# Patient Record
Sex: FEMALE | Race: WHITE | NOT HISPANIC OR LATINO
[De-identification: names, ages, dates, MRNs, and addresses within clinical notes are randomized per-mention and may not be internally consistent; named-entity substitution may affect disease eponyms.]

---

## 2017-02-27 ENCOUNTER — APPOINTMENT (OUTPATIENT)
Dept: ENDOCRINOLOGY | Facility: CLINIC | Age: 62
End: 2017-02-27

## 2017-05-16 ENCOUNTER — APPOINTMENT (OUTPATIENT)
Dept: ENDOCRINOLOGY | Facility: CLINIC | Age: 62
End: 2017-05-16

## 2017-06-14 ENCOUNTER — EMERGENCY (EMERGENCY)
Facility: HOSPITAL | Age: 62
LOS: 1 days | Discharge: HOME | End: 2017-06-14
Attending: EMERGENCY MEDICINE | Admitting: EMERGENCY MEDICINE
Payer: COMMERCIAL

## 2017-06-14 VITALS
HEART RATE: 80 BPM | SYSTOLIC BLOOD PRESSURE: 149 MMHG | DIASTOLIC BLOOD PRESSURE: 72 MMHG | WEIGHT: 162.04 LBS | OXYGEN SATURATION: 98 % | RESPIRATION RATE: 16 BRPM | TEMPERATURE: 98 F

## 2017-06-14 VITALS
OXYGEN SATURATION: 99 % | DIASTOLIC BLOOD PRESSURE: 79 MMHG | HEART RATE: 72 BPM | TEMPERATURE: 98 F | RESPIRATION RATE: 16 BRPM | SYSTOLIC BLOOD PRESSURE: 122 MMHG

## 2017-06-14 DIAGNOSIS — E78.5 HYPERLIPIDEMIA, UNSPECIFIED: ICD-10-CM
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
## 2017-06-14 DIAGNOSIS — S00.83XA CONTUSION OF OTHER PART OF HEAD, INITIAL ENCOUNTER: ICD-10-CM
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
## 2017-06-14 DIAGNOSIS — S09.90XA UNSPECIFIED INJURY OF HEAD, INITIAL ENCOUNTER: ICD-10-CM

## 2017-06-14 LAB
ALBUMIN SERPL ELPH-MCNC: 4.2 G/DL — SIGNIFICANT CHANGE UP (ref 3.3–5)
ALP SERPL-CCNC: 97 U/L — SIGNIFICANT CHANGE UP (ref 40–120)
ALT FLD-CCNC: 14 U/L — SIGNIFICANT CHANGE UP (ref 10–45)
ANION GAP SERPL CALC-SCNC: 15 MMOL/L — SIGNIFICANT CHANGE UP (ref 5–17)
AST SERPL-CCNC: 17 U/L — SIGNIFICANT CHANGE UP (ref 10–40)
BASOPHILS NFR BLD AUTO: 0.3 % — SIGNIFICANT CHANGE UP (ref 0–2)
BILIRUB SERPL-MCNC: 0.6 MG/DL — SIGNIFICANT CHANGE UP (ref 0.2–1.2)
BUN SERPL-MCNC: 7 MG/DL — SIGNIFICANT CHANGE UP (ref 7–23)
CALCIUM SERPL-MCNC: 9.3 MG/DL — SIGNIFICANT CHANGE UP (ref 8.4–10.5)
CHLORIDE SERPL-SCNC: 96 MMOL/L — SIGNIFICANT CHANGE UP (ref 96–108)
CK MB CFR SERPL CALC: 2.6 NG/ML — SIGNIFICANT CHANGE UP (ref 0–6.7)
CK SERPL-CCNC: 80 U/L — SIGNIFICANT CHANGE UP (ref 25–170)
CO2 SERPL-SCNC: 22 MMOL/L — SIGNIFICANT CHANGE UP (ref 22–31)
CREAT SERPL-MCNC: 0.5 MG/DL — SIGNIFICANT CHANGE UP (ref 0.5–1.3)
EOSINOPHIL NFR BLD AUTO: 0.3 % — SIGNIFICANT CHANGE UP (ref 0–6)
GLUCOSE SERPL-MCNC: 107 MG/DL — HIGH (ref 70–99)
HCT VFR BLD CALC: 36.9 % — SIGNIFICANT CHANGE UP (ref 34.5–45)
HGB BLD-MCNC: 12.3 G/DL — SIGNIFICANT CHANGE UP (ref 11.5–15.5)
LYMPHOCYTES # BLD AUTO: 11.4 % — LOW (ref 13–44)
MCHC RBC-ENTMCNC: 29.4 PG — SIGNIFICANT CHANGE UP (ref 27–34)
MCHC RBC-ENTMCNC: 33.3 G/DL — SIGNIFICANT CHANGE UP (ref 32–36)
MCV RBC AUTO: 88.3 FL — SIGNIFICANT CHANGE UP (ref 80–100)
MONOCYTES NFR BLD AUTO: 4.8 % — SIGNIFICANT CHANGE UP (ref 2–14)
NEUTROPHILS NFR BLD AUTO: 83.2 % — HIGH (ref 43–77)
PLATELET # BLD AUTO: 261 K/UL — SIGNIFICANT CHANGE UP (ref 150–400)
POTASSIUM SERPL-MCNC: 4.2 MMOL/L — SIGNIFICANT CHANGE UP (ref 3.5–5.3)
POTASSIUM SERPL-SCNC: 4.2 MMOL/L — SIGNIFICANT CHANGE UP (ref 3.5–5.3)
PROT SERPL-MCNC: 7 G/DL — SIGNIFICANT CHANGE UP (ref 6–8.3)
RBC # BLD: 4.18 M/UL — SIGNIFICANT CHANGE UP (ref 3.8–5.2)
RBC # FLD: 13.1 % — SIGNIFICANT CHANGE UP (ref 10.3–16.9)
SODIUM SERPL-SCNC: 133 MMOL/L — LOW (ref 135–145)
TROPONIN T SERPL-MCNC: <0.01 NG/ML — SIGNIFICANT CHANGE UP (ref 0–0.01)
TSH SERPL-MCNC: 0.77 UIU/ML — SIGNIFICANT CHANGE UP (ref 0.35–4.94)
WBC # BLD: 6.6 K/UL — SIGNIFICANT CHANGE UP (ref 3.8–10.5)
WBC # FLD AUTO: 6.6 K/UL — SIGNIFICANT CHANGE UP (ref 3.8–10.5)

## 2017-06-14 PROCEDURE — 84484 ASSAY OF TROPONIN QUANT: CPT

## 2017-06-14 PROCEDURE — 70450 CT HEAD/BRAIN W/O DYE: CPT

## 2017-06-14 PROCEDURE — 70450 CT HEAD/BRAIN W/O DYE: CPT | Mod: 26

## 2017-06-14 PROCEDURE — 99285 EMERGENCY DEPT VISIT HI MDM: CPT | Mod: 25

## 2017-06-14 PROCEDURE — 93010 ELECTROCARDIOGRAM REPORT: CPT

## 2017-06-14 PROCEDURE — 80053 COMPREHEN METABOLIC PANEL: CPT

## 2017-06-14 PROCEDURE — 82553 CREATINE MB FRACTION: CPT

## 2017-06-14 PROCEDURE — 82550 ASSAY OF CK (CPK): CPT

## 2017-06-14 PROCEDURE — 93005 ELECTROCARDIOGRAM TRACING: CPT

## 2017-06-14 PROCEDURE — 85025 COMPLETE CBC W/AUTO DIFF WBC: CPT

## 2017-06-14 PROCEDURE — 36415 COLL VENOUS BLD VENIPUNCTURE: CPT

## 2017-06-14 PROCEDURE — 99284 EMERGENCY DEPT VISIT MOD MDM: CPT | Mod: 25

## 2017-06-14 PROCEDURE — 84443 ASSAY THYROID STIM HORMONE: CPT

## 2017-06-14 RX ORDER — LEVOTHYROXINE SODIUM 125 MCG
0 TABLET ORAL
Qty: 0 | Refills: 0 | COMMUNITY

## 2017-06-14 RX ORDER — PROPRANOLOL HCL 160 MG
0 CAPSULE, EXTENDED RELEASE 24HR ORAL
Qty: 0 | Refills: 0 | COMMUNITY

## 2017-06-14 RX ORDER — IBUPROFEN 200 MG
0 TABLET ORAL
Qty: 0 | Refills: 0 | COMMUNITY

## 2017-06-14 RX ORDER — ATORVASTATIN CALCIUM 80 MG/1
0 TABLET, FILM COATED ORAL
Qty: 0 | Refills: 0 | COMMUNITY

## 2017-06-14 RX ORDER — ZOLPIDEM TARTRATE 10 MG/1
0 TABLET ORAL
Qty: 0 | Refills: 0 | COMMUNITY

## 2017-06-14 NOTE — ED ADULT TRIAGE NOTE - ARRIVAL INFO ADDITIONAL COMMENTS
Patient hit right side of forhead, +eccymotic area noted. Patient states, "I think I have a bug, this morning I have been feeling nauseous and have had diarrhea." Denies any visual changes, numbness or tingling to extremities, fever/chills, vomiting, blood in stool, CP, SOB, dizziness at this time, HA, use of blood thinners. EKG being performed in triage.

## 2017-06-14 NOTE — ED PROVIDER NOTE - MEDICAL DECISION MAKING DETAILS
63 yo female presents with fall / CHI and LOC after she took 10 mg ambien to sleep. Labs/ CT noted and WNL. Pt reassured. Fall and LOC likely sec to ambien use. Pt to address this with her physician. To f/up outpt.

## 2017-06-14 NOTE — ED ADULT NURSE NOTE - PMH
<<----- Click to add NO pertinent Past Medical History No pertinent past medical history High cholesterol    HTN (hypertension)    Hypothyroid    Insomnia

## 2017-10-25 ENCOUNTER — OUTPATIENT (OUTPATIENT)
Dept: OUTPATIENT SERVICES | Facility: HOSPITAL | Age: 62
LOS: 1 days | End: 2017-10-25
Payer: COMMERCIAL

## 2017-10-25 PROCEDURE — 71020: CPT | Mod: 26

## 2017-10-25 PROCEDURE — 71046 X-RAY EXAM CHEST 2 VIEWS: CPT

## 2018-04-23 ENCOUNTER — EMERGENCY (EMERGENCY)
Facility: HOSPITAL | Age: 63
LOS: 1 days | Discharge: HOME | End: 2018-04-23
Attending: EMERGENCY MEDICINE | Admitting: EMERGENCY MEDICINE
Payer: COMMERCIAL

## 2018-04-23 VITALS
OXYGEN SATURATION: 96 % | TEMPERATURE: 98 F | DIASTOLIC BLOOD PRESSURE: 75 MMHG | RESPIRATION RATE: 18 BRPM | WEIGHT: 160.06 LBS | HEART RATE: 69 BPM | SYSTOLIC BLOOD PRESSURE: 112 MMHG

## 2018-04-23 DIAGNOSIS — Z79.899 OTHER LONG TERM (CURRENT) DRUG THERAPY: ICD-10-CM

## 2018-04-23 DIAGNOSIS — R35.0 FREQUENCY OF MICTURITION: ICD-10-CM

## 2018-04-23 DIAGNOSIS — E78.00 PURE HYPERCHOLESTEROLEMIA, UNSPECIFIED: ICD-10-CM

## 2018-04-23 DIAGNOSIS — I10 ESSENTIAL (PRIMARY) HYPERTENSION: ICD-10-CM

## 2018-04-23 LAB
APPEARANCE UR: CLEAR — SIGNIFICANT CHANGE UP
BILIRUB UR-MCNC: NEGATIVE — SIGNIFICANT CHANGE UP
COLOR SPEC: SIGNIFICANT CHANGE UP
DIFF PNL FLD: NEGATIVE — SIGNIFICANT CHANGE UP
GLUCOSE UR QL: NEGATIVE — SIGNIFICANT CHANGE UP
KETONES UR-MCNC: NEGATIVE — SIGNIFICANT CHANGE UP
LEUKOCYTE ESTERASE UR-ACNC: NEGATIVE — SIGNIFICANT CHANGE UP
NITRITE UR-MCNC: NEGATIVE — SIGNIFICANT CHANGE UP
PH UR: 7 — SIGNIFICANT CHANGE UP (ref 5–8)
PROT UR-MCNC: NEGATIVE MG/DL — SIGNIFICANT CHANGE UP
SP GR SPEC: <=1.005 — SIGNIFICANT CHANGE UP (ref 1–1.03)
UROBILINOGEN FLD QL: 0.2 E.U./DL — SIGNIFICANT CHANGE UP

## 2018-04-23 PROCEDURE — 81003 URINALYSIS AUTO W/O SCOPE: CPT

## 2018-04-23 PROCEDURE — 87086 URINE CULTURE/COLONY COUNT: CPT

## 2018-04-23 PROCEDURE — 99283 EMERGENCY DEPT VISIT LOW MDM: CPT

## 2018-04-23 PROCEDURE — 82962 GLUCOSE BLOOD TEST: CPT

## 2018-04-23 NOTE — ED PROVIDER NOTE - MEDICAL DECISION MAKING DETAILS
urinary frequency.  ua neg, fs normal.  possible overactive bladder.  to f/u with pmd/ urology if continues.  culture sent but will not treat for uti at this time given lack of other symptoms

## 2018-04-23 NOTE — ED PROVIDER NOTE - OBJECTIVE STATEMENT
here with urinary frequency intermittently for the past week.  Says she had this a few years ago and the culture was neg.  Denies fever/chills, diarrhea, abdominal pain.  Has been drinking a little more than normal.

## 2018-04-23 NOTE — ED ADULT NURSE NOTE - CHPI ED SYMPTOMS NEG
no abdominal distension/no hematuria/no burning urination/no blood in stool/no fever/no diarrhea/no chills/no vomiting/no dysuria/no nausea

## 2018-04-24 LAB
CULTURE RESULTS: NO GROWTH — SIGNIFICANT CHANGE UP
SPECIMEN SOURCE: SIGNIFICANT CHANGE UP

## 2018-04-28 ENCOUNTER — EMERGENCY (EMERGENCY)
Facility: HOSPITAL | Age: 63
LOS: 1 days | Discharge: HOME | End: 2018-04-28
Attending: EMERGENCY MEDICINE | Admitting: EMERGENCY MEDICINE
Payer: OTHER MISCELLANEOUS

## 2018-04-28 VITALS
HEIGHT: 65 IN | SYSTOLIC BLOOD PRESSURE: 144 MMHG | HEART RATE: 91 BPM | TEMPERATURE: 98 F | RESPIRATION RATE: 18 BRPM | WEIGHT: 164.91 LBS | OXYGEN SATURATION: 98 % | DIASTOLIC BLOOD PRESSURE: 67 MMHG

## 2018-04-28 DIAGNOSIS — S99.921A UNSPECIFIED INJURY OF RIGHT FOOT, INITIAL ENCOUNTER: ICD-10-CM

## 2018-04-28 DIAGNOSIS — Z79.899 OTHER LONG TERM (CURRENT) DRUG THERAPY: ICD-10-CM
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
## 2018-04-28 DIAGNOSIS — M79.671 PAIN IN RIGHT FOOT: ICD-10-CM
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
## 2018-04-28 PROCEDURE — 99283 EMERGENCY DEPT VISIT LOW MDM: CPT

## 2018-04-28 PROCEDURE — 73630 X-RAY EXAM OF FOOT: CPT

## 2018-04-28 PROCEDURE — 73630 X-RAY EXAM OF FOOT: CPT | Mod: 26,50

## 2018-04-28 PROCEDURE — 99284 EMERGENCY DEPT VISIT MOD MDM: CPT

## 2018-04-28 NOTE — ED PROVIDER NOTE - MEDICAL DECISION MAKING DETAILS
toe injury with no visible fracture, instructed to ice, elevate, ibuprofen and offered surgical shoe.

## 2018-04-28 NOTE — ED ADULT TRIAGE NOTE - ARRIVAL INFO ADDITIONAL COMMENTS
states a chair fell on her right foot yesterday. c.o pain and swelling to right toe. discoloration and swelling noted. states she took advil 400 mg at 3pm. denies any numbness/tingling to toe.

## 2018-04-28 NOTE — ED PROVIDER NOTE - OBJECTIVE STATEMENT
62yo female with contusion to R toe after chair fell on toe. Able to walk, ROM normal. Mild bruising.

## 2018-05-14 ENCOUNTER — APPOINTMENT (OUTPATIENT)
Dept: ORTHOPEDIC SURGERY | Facility: CLINIC | Age: 63
End: 2018-05-14
Payer: OTHER MISCELLANEOUS

## 2018-05-14 VITALS — WEIGHT: 160 LBS | HEIGHT: 65 IN | BODY MASS INDEX: 26.66 KG/M2

## 2018-05-14 DIAGNOSIS — M48.00 SPINAL STENOSIS, SITE UNSPECIFIED: ICD-10-CM

## 2018-05-14 DIAGNOSIS — Z78.9 OTHER SPECIFIED HEALTH STATUS: ICD-10-CM

## 2018-05-14 DIAGNOSIS — E78.00 PURE HYPERCHOLESTEROLEMIA, UNSPECIFIED: ICD-10-CM

## 2018-05-14 DIAGNOSIS — Z82.62 FAMILY HISTORY OF OSTEOPOROSIS: ICD-10-CM

## 2018-05-14 DIAGNOSIS — E05.90 THYROTOXICOSIS, UNSPECIFIED W/OUT THYROTOXIC CRISIS OR STORM: ICD-10-CM

## 2018-05-14 PROCEDURE — 99203 OFFICE O/P NEW LOW 30 MIN: CPT

## 2018-05-14 PROCEDURE — 73630 X-RAY EXAM OF FOOT: CPT | Mod: 50

## 2018-05-14 RX ORDER — PROPRANOLOL HCL 80 MG
TABLET ORAL
Refills: 0 | Status: ACTIVE | COMMUNITY

## 2018-05-14 RX ORDER — ATORVASTATIN CALCIUM 80 MG/1
TABLET, FILM COATED ORAL
Refills: 0 | Status: ACTIVE | COMMUNITY

## 2018-05-14 RX ORDER — ZOLPIDEM TARTRATE 5 MG/1
TABLET, FILM COATED ORAL
Refills: 0 | Status: ACTIVE | COMMUNITY

## 2018-05-24 RX ORDER — ATORVASTATIN CALCIUM 10 MG/1
10 TABLET, FILM COATED ORAL
Qty: 90 | Refills: 0 | Status: COMPLETED | COMMUNITY
Start: 2017-11-27

## 2018-05-25 ENCOUNTER — APPOINTMENT (OUTPATIENT)
Dept: ORTHOPEDIC SURGERY | Facility: CLINIC | Age: 63
End: 2018-05-25
Payer: OTHER MISCELLANEOUS

## 2018-05-25 PROCEDURE — 99213 OFFICE O/P EST LOW 20 MIN: CPT

## 2018-05-29 ENCOUNTER — APPOINTMENT (OUTPATIENT)
Dept: UROLOGY | Facility: CLINIC | Age: 63
End: 2018-05-29

## 2018-06-15 ENCOUNTER — APPOINTMENT (OUTPATIENT)
Dept: ORTHOPEDIC SURGERY | Facility: CLINIC | Age: 63
End: 2018-06-15
Payer: OTHER MISCELLANEOUS

## 2018-06-15 PROCEDURE — 99214 OFFICE O/P EST MOD 30 MIN: CPT

## 2018-07-17 PROBLEM — G47.00 INSOMNIA, UNSPECIFIED: Chronic | Status: ACTIVE | Noted: 2017-06-14

## 2018-07-17 PROBLEM — I10 ESSENTIAL (PRIMARY) HYPERTENSION: Chronic | Status: ACTIVE | Noted: 2017-06-14

## 2018-07-17 PROBLEM — E03.9 HYPOTHYROIDISM, UNSPECIFIED: Chronic | Status: ACTIVE | Noted: 2017-06-14

## 2018-07-17 PROBLEM — E78.00 PURE HYPERCHOLESTEROLEMIA, UNSPECIFIED: Chronic | Status: ACTIVE | Noted: 2017-06-14

## 2018-07-19 ENCOUNTER — OTHER (OUTPATIENT)
Age: 63
End: 2018-07-19

## 2018-07-20 ENCOUNTER — APPOINTMENT (OUTPATIENT)
Dept: ORTHOPEDIC SURGERY | Facility: CLINIC | Age: 63
End: 2018-07-20

## 2018-07-27 ENCOUNTER — APPOINTMENT (OUTPATIENT)
Dept: ORTHOPEDIC SURGERY | Facility: CLINIC | Age: 63
End: 2018-07-27
Payer: OTHER MISCELLANEOUS

## 2018-07-27 PROCEDURE — 99213 OFFICE O/P EST LOW 20 MIN: CPT

## 2018-08-08 ENCOUNTER — APPOINTMENT (OUTPATIENT)
Dept: ORTHOPEDIC SURGERY | Facility: CLINIC | Age: 63
End: 2018-08-08
Payer: OTHER MISCELLANEOUS

## 2018-08-08 PROCEDURE — 99213 OFFICE O/P EST LOW 20 MIN: CPT

## 2018-09-19 ENCOUNTER — APPOINTMENT (OUTPATIENT)
Dept: ORTHOPEDIC SURGERY | Facility: CLINIC | Age: 63
End: 2018-09-19
Payer: OTHER MISCELLANEOUS

## 2018-09-19 PROCEDURE — 99213 OFFICE O/P EST LOW 20 MIN: CPT

## 2018-10-24 ENCOUNTER — OTHER (OUTPATIENT)
Age: 63
End: 2018-10-24

## 2018-10-24 ENCOUNTER — APPOINTMENT (OUTPATIENT)
Dept: ORTHOPEDIC SURGERY | Facility: CLINIC | Age: 63
End: 2018-10-24
Payer: OTHER MISCELLANEOUS

## 2018-10-24 PROCEDURE — 99213 OFFICE O/P EST LOW 20 MIN: CPT

## 2018-11-30 ENCOUNTER — OUTPATIENT (OUTPATIENT)
Dept: OUTPATIENT SERVICES | Facility: HOSPITAL | Age: 63
LOS: 1 days | End: 2018-11-30
Payer: COMMERCIAL

## 2018-11-30 ENCOUNTER — APPOINTMENT (OUTPATIENT)
Dept: ULTRASOUND IMAGING | Facility: HOSPITAL | Age: 63
End: 2018-11-30
Payer: COMMERCIAL

## 2018-11-30 PROCEDURE — 76536 US EXAM OF HEAD AND NECK: CPT | Mod: 26

## 2018-11-30 PROCEDURE — 76536 US EXAM OF HEAD AND NECK: CPT

## 2018-12-19 ENCOUNTER — APPOINTMENT (OUTPATIENT)
Dept: ORTHOPEDIC SURGERY | Facility: CLINIC | Age: 63
End: 2018-12-19
Payer: OTHER MISCELLANEOUS

## 2018-12-19 PROCEDURE — 99213 OFFICE O/P EST LOW 20 MIN: CPT

## 2019-02-05 ENCOUNTER — APPOINTMENT (OUTPATIENT)
Dept: ORTHOPEDIC SURGERY | Facility: CLINIC | Age: 64
End: 2019-02-05
Payer: OTHER MISCELLANEOUS

## 2019-02-05 PROCEDURE — 99214 OFFICE O/P EST MOD 30 MIN: CPT

## 2019-02-05 NOTE — REASON FOR VISIT
[Worker's Compensation] : This visit is related to worker's compensation [Follow-Up Visit] : a follow-up visit for [FreeTextEntry2] : contusions feet

## 2019-02-05 NOTE — ASSESSMENT
[FreeTextEntry1] : A 63-year-old who had contusions to bilateral hallux in April 2018.\par Her LEFT foot steering fine but the RIGHT side still had some pain and slight swelling. It has improved. Gradually and slowly. She is walking further and better now.\par It still seems to be improving. My sense is that there is some sensitized nerve endings from the contusion. This may continue to improve for a year or so after the injury. I recommended that she continue with the home exercises, massage, warm soaks and ice as needed and elevate as needed. Ibuprofen as needed. She is working on losing the weight she gained. She should continue to wear wide supportive shoes.\par There is a 20% temporary impairment of the RIGHT foot.\par Continue work\par Followup in 2 months

## 2019-02-05 NOTE — HISTORY OF PRESENT ILLNESS
[de-identified] : Right foot is still painful. She is walking more but as she walks more she feels more discomfort. A box fell on her foot in December which aggravated it temporarily \par She walked about 0.5 miles to get here today.\par She needs to take off her shoes frequently. It swell some.It is sensitive to the touch.\par She takes 3-4 Advil / day. \par \par Left foot is feeling good. No significant pain at all.\par \par She gained weight-20lbs.

## 2019-02-05 NOTE — PHYSICAL EXAM
[Normal RLE] : Right Lower Extremity: No scars, rashes, lesions, ulcers, skin intact [Normal LLE] : Left Lower Extremity: No scars, rashes, lesions, ulcers, skin intact [Normal Touch] : sensation intact for touch [de-identified] : Bilateral feet\par She appears to be favoring the RIGHT foot subtly. Discomfort when walking in shoes and barefooted.\par RIGHT foot is with some subtle edema at the base of the hallux dorsal lateral. LEFT foot is with No edema, erythema, ecchymoses.\par Hallux MPJ ROM  30-35° dorsiflexion and 15° plantar flexion about equal bilaterally\par 5/5 EHL, FHL bilaterally\par No deformity.\par Ankle and subtalar motion are intact.\par Motor and sensation are intact bilateral ankles [de-identified] : \par

## 2019-04-03 ENCOUNTER — APPOINTMENT (OUTPATIENT)
Dept: ORTHOPEDIC SURGERY | Facility: CLINIC | Age: 64
End: 2019-04-03
Payer: OTHER MISCELLANEOUS

## 2019-04-03 PROCEDURE — 99213 OFFICE O/P EST LOW 20 MIN: CPT

## 2019-04-03 PROCEDURE — 73630 X-RAY EXAM OF FOOT: CPT | Mod: RT

## 2019-04-03 NOTE — PHYSICAL EXAM
[LE] : Sensory: Intact in bilateral lower extremities [DP] : dorsalis pedis 2+ and symmetric bilaterally [PT] : posterior tibial 2+ and symmetric bilaterally [Normal RLE] : Right Lower Extremity: No scars, rashes, lesions, ulcers, skin intact [Normal LLE] : Left Lower Extremity: No scars, rashes, lesions, ulcers, skin intact [Normal Touch] : sensation intact for touch [Normal] : No swelling, no edema, normal pedal pulses and normal temperature [Fit For Work] : fit for work [Maximum Medical Improvement ( ___ % )] : Maximum medical imporvement of [unfilled]% [de-identified] : Bilateral feet:\par She walks with a cautious gait when barefoot and tends to hold the hallux up more on the RIGHT than LEFT. She walks better in sneakers short distance in the office.\par No significant edema, no ecchymoses, no erythema.\par Skin is intact.\par Tender mildly around the base of the hallux RIGHT but not LEFT.\par Hallux MP joint range of motion is with about 40° dorsiflexion and 20° plantar flexion without crepitus. No deformity of the hallux or toes.\par Sensation is intact. Intact EHL and FHL bilaterally.\par Normal capillary refill.\par left 2nd hammertoe\par Healed lateral left ankle incision from prior ankle fracture surgery.\par She does not feel comfortable walking on her toes on the RIGHT side because it causes pain [de-identified] : X-rays bilateral feet weight-bearing 3 views today show no fractures or arthritis or bony lesions.\par Ms. visible hardware in the LEFT fibula from prior fracture.\par Second hammertoe LEFT foot

## 2019-04-03 NOTE — REASON FOR VISIT
[Follow-Up Visit] : a follow-up visit for [Worker's Compensation] : This visit is related to worker's compensation [FreeTextEntry2] : Bilateral feet contusions

## 2019-04-03 NOTE — ASSESSMENT
[FreeTextEntry1] : 64-year-old with contusions bilateral hallux work injury one year ago.\par She is back at work At her preinjury job and makes modifications with shoes and ambulation as needed.\par She has reached maximum medical improvement. Her symptoms have been stable for the last 3 months or so without any significant change. She has chronic pain in the RIGHT foot and no significant pain in the LEFT foot.\par X-rays do not show any arthritis or change.\par Her pain is likely from contusion to the soft tissues or nerves. There is no further treatment being offered at this time. She will continue with her home exercise program. She can take ibuprofen as needed.\par There is a 12 percent loss of use of the RIGHT foot and no loss in the LEFT foot based on limitations and chronic pain.Followup as needed\par

## 2019-04-03 NOTE — HISTORY OF PRESENT ILLNESS
[de-identified] : Zenaida comes in for followup for her feet. It has been almost a year since the injury where a chair came down hard on her RIGHT greater than LEFT hallux.\par She still has pain in the right hallux.This relatively good without any major issues.\par Pain is worse with rainy weather.\par It is also worse when the more she walks. She feels better walking barefoot at home than walking outside in sneakers.\par She takes Advil 2-3 / day.\par She does walk more now than she did 6 months ago but he can still aggravate the pain. Doing the foot exercises can bother her feet sometimes

## 2019-05-25 ENCOUNTER — EMERGENCY (EMERGENCY)
Facility: HOSPITAL | Age: 64
LOS: 1 days | Discharge: HOME | End: 2019-05-25
Admitting: EMERGENCY MEDICINE
Payer: COMMERCIAL

## 2019-05-25 VITALS
WEIGHT: 181.66 LBS | DIASTOLIC BLOOD PRESSURE: 87 MMHG | RESPIRATION RATE: 16 BRPM | OXYGEN SATURATION: 98 % | SYSTOLIC BLOOD PRESSURE: 145 MMHG | TEMPERATURE: 98 F | HEART RATE: 80 BPM

## 2019-05-25 DIAGNOSIS — Z79.899 OTHER LONG TERM (CURRENT) DRUG THERAPY: ICD-10-CM

## 2019-05-25 DIAGNOSIS — M54.5 LOW BACK PAIN: ICD-10-CM

## 2019-05-25 DIAGNOSIS — E03.9 HYPOTHYROIDISM, UNSPECIFIED: ICD-10-CM

## 2019-05-25 DIAGNOSIS — M53.3 SACROCOCCYGEAL DISORDERS, NOT ELSEWHERE CLASSIFIED: ICD-10-CM

## 2019-05-25 DIAGNOSIS — E78.00 PURE HYPERCHOLESTEROLEMIA, UNSPECIFIED: ICD-10-CM

## 2019-05-25 DIAGNOSIS — Z79.1 LONG TERM (CURRENT) USE OF NON-STEROIDAL ANTI-INFLAMMATORIES (NSAID): ICD-10-CM

## 2019-05-25 LAB
APPEARANCE UR: CLEAR — SIGNIFICANT CHANGE UP
BILIRUB UR-MCNC: NEGATIVE — SIGNIFICANT CHANGE UP
COLOR SPEC: YELLOW — SIGNIFICANT CHANGE UP
DIFF PNL FLD: NEGATIVE — SIGNIFICANT CHANGE UP
GLUCOSE UR QL: NEGATIVE — SIGNIFICANT CHANGE UP
KETONES UR-MCNC: NEGATIVE — SIGNIFICANT CHANGE UP
LEUKOCYTE ESTERASE UR-ACNC: ABNORMAL
NITRITE UR-MCNC: NEGATIVE — SIGNIFICANT CHANGE UP
PH UR: 6 — SIGNIFICANT CHANGE UP (ref 5–8)
PROT UR-MCNC: NEGATIVE MG/DL — SIGNIFICANT CHANGE UP
SP GR SPEC: <=1.005 — SIGNIFICANT CHANGE UP (ref 1–1.03)
UROBILINOGEN FLD QL: 0.2 E.U./DL — SIGNIFICANT CHANGE UP

## 2019-05-25 PROCEDURE — 72100 X-RAY EXAM L-S SPINE 2/3 VWS: CPT | Mod: 26

## 2019-05-25 PROCEDURE — 72220 X-RAY EXAM SACRUM TAILBONE: CPT | Mod: 26

## 2019-05-25 PROCEDURE — 99284 EMERGENCY DEPT VISIT MOD MDM: CPT

## 2019-05-25 PROCEDURE — 72100 X-RAY EXAM L-S SPINE 2/3 VWS: CPT

## 2019-05-25 PROCEDURE — 72220 X-RAY EXAM SACRUM TAILBONE: CPT

## 2019-05-25 PROCEDURE — 99283 EMERGENCY DEPT VISIT LOW MDM: CPT

## 2019-05-25 PROCEDURE — 73630 X-RAY EXAM OF FOOT: CPT | Mod: 26,LT

## 2019-05-25 PROCEDURE — 73610 X-RAY EXAM OF ANKLE: CPT

## 2019-05-25 PROCEDURE — 73610 X-RAY EXAM OF ANKLE: CPT | Mod: 26,LT

## 2019-05-25 PROCEDURE — 81001 URINALYSIS AUTO W/SCOPE: CPT

## 2019-05-25 PROCEDURE — 73630 X-RAY EXAM OF FOOT: CPT

## 2019-05-25 RX ORDER — DIAZEPAM 5 MG
5 TABLET ORAL ONCE
Refills: 0 | Status: DISCONTINUED | OUTPATIENT
Start: 2019-05-25 | End: 2019-05-25

## 2019-05-25 RX ORDER — DIAZEPAM 5 MG
1 TABLET ORAL
Qty: 3 | Refills: 0
Start: 2019-05-25 | End: 2019-05-27

## 2019-05-25 RX ORDER — IBUPROFEN 200 MG
1 TABLET ORAL
Qty: 21 | Refills: 0
Start: 2019-05-25 | End: 2019-05-31

## 2019-05-25 RX ORDER — IBUPROFEN 200 MG
600 TABLET ORAL ONCE
Refills: 0 | Status: COMPLETED | OUTPATIENT
Start: 2019-05-25 | End: 2019-05-25

## 2019-05-25 RX ADMIN — Medication 5 MILLIGRAM(S): at 16:50

## 2019-05-25 RX ADMIN — Medication 600 MILLIGRAM(S): at 16:50

## 2019-05-25 NOTE — ED PROVIDER NOTE - PHYSICAL EXAMINATION
GENERAL: WD/WN, in NAD  NEURO: Alert and oriented. CN II-XII intact. Motor strength 5/5 in all extremities.  SILT in all extremities.  Symmetric DTR  +2 bilaterally in biceps, BR, patellar.  HEAD: NC/AT  EYES: Clear bilaterally; Pupils equal and round  ENT: OP clear, no rhinorrhea  PULM: No signs of respiratory distress; lungs clear bilaterally  CV: RRR, S1S2, No MRG. Symmetric distal pulses bilaterally.  GI: Abdomen soft, nontender.  No abdominal masses or abnormal pulsations appreciated.  Normal rectal tone.  SKIN: normal color and turgor; no rash or lesions  MSK: NO STEP-OFFS NO TTP, GOOD ROM, NEG SLR, REFLEX NORMAL, 5/5 STRENGTH

## 2019-05-25 NOTE — ED PROVIDER NOTE - OBJECTIVE STATEMENT
65 y/o female with a PMHx of HNP (L4-L5 with laminectomy 2005) is present in the ED c/o back pain x5 days. Pt reports she slipped as she was walking down a step and fell onto her bottom. Pt reports pain located lower back with radiation to the back of her behind. Pain is constant and worsens with walking/sitting. Pt reports she has been taking advil for the pain with significant improvement. She denies the following: LOC, head/neck injury, numbness/tingling of extremities, loss of urine/bm, saddle anesthesia, inability to ambulate. Pt reports she has a follow-up appointment with Dr. Scherer next month.

## 2019-05-25 NOTE — ED ADULT NURSE NOTE - OBJECTIVE STATEMENT
Pt. s/p mechanical fall on Tuesday, c/o ongoing L lower back pain. Pt. fell on buttocks, denies head injury/LOC. Pt. has been taking advil (last took 1 hour ago) and applying lidocaine patch w/ partial relief. Ambulatory independently with steady gait.

## 2019-05-25 NOTE — ED PROVIDER NOTE - CARE PLAN
Principal Discharge DX:	Back pain Principal Discharge DX:	Back pain  Secondary Diagnosis:	Coccygeal pain

## 2019-05-25 NOTE — ED PROVIDER NOTE - CARE PROVIDER_API CALL
Devon Scherer)  Orthopedics  [address] [address]  Phone: [phone]  Fax: [phone]  Follow Up Time:

## 2019-05-25 NOTE — ED PROVIDER NOTE - NS ED ROS FT
CONSTITUTIONAL: No fever/chills.  NEURO: no headache; no tingling/numbness/weakness in extremities; no saddle anesthesia.  CV: no chest pain or palpitations  PULM: no dyspnea, cough, or hemoptysis  GI: no abdominal pain; no N/V; no BRBPR or melena; no diarrhea or fecal incontinence  : no dysuria/hematura/frequency; no urinary incontinence or retention  MSK: no neck pain; no joint pain; no swelling of extremities. PAIN TO LOWER MID BACK  DERM: no rash or lesions

## 2019-05-25 NOTE — ED ADULT TRIAGE NOTE - OTHER COMPLAINTS
reports missed 1 stepped on tuesday at [facility]. no head injury nor loc. reports fell onto back. Reports increased pain to buttocks and lower back. no incontinence. ambulatory with steady gait. took advil prior to arrival.

## 2019-05-25 NOTE — ED PROVIDER NOTE - CLINICAL SUMMARY MEDICAL DECISION MAKING FREE TEXT BOX
63 y/o female with lower back pain s/p mechanical fall. Ambulatory. Neurovascular intact. No concern for cauda equina. VS nml. UA negative for blood. Pain treated. Advised rest, light back stretching/exercise and follow up with Dr. Scherer. Pt agrees to plan

## 2019-05-25 NOTE — ED PROVIDER NOTE - DIAGNOSTIC INTERPRETATION
LEFT ANKLE: no acute fracture; no soft tissue swelling noted; normal bony alignment.  LEFT FOOT: no acute fracture; no soft tissue swelling noted; normal bony alignment.  LUMBAR SPINE: no acute fracture; no soft tissue swelling noted; normal bony alignment.  COCCYX/SACRUM: no acute fracture; no soft tissue swelling noted; normal bony alignment.

## 2019-05-25 NOTE — ED PROVIDER NOTE - NSFOLLOWUPINSTRUCTIONS_ED_ALL_ED_FT
Please rest, light exercise and stretching, continue applying warm packs and take medication as needed for pain. Please follow up with Dr. Scherer. Return to the Emergency Department if you have any new or worsening symptoms, or if you have any concerns.    Musculoskeletal Pain  Musculoskeletal pain refers to aches and pains in your bones, joints, muscles, and the tissues that surround them. This pain can occur in any part of the body. It can last for a short time (acute) or a long time (chronic).    A physical exam, lab tests, and imaging studies may be done to find the cause of your musculoskeletal pain.    Follow these instructions at home:  Image   Lifestyle     Try to control or lower your stress levels. Stress increases muscle tension and can worsen musculoskeletal pain. It is important to recognize when you are anxious or stressed and learn ways to manage it. This may include:  Meditation or yoga.  Cognitive or behavioral therapy.  Acupuncture or massage therapy.  You may continue all activities unless the activities cause more pain. When the pain gets better, slowly resume your normal activities. Gradually increase the intensity and duration of your activities or exercise.  Managing pain, stiffness, and swelling     Take over-the-counter and prescription medicines only as told by your health care provider.  When your pain is severe, bed rest may be helpful. Lie or sit in any position that is comfortable, but get out of bed and walk around at least every couple of hours.  If directed, apply heat to the affected area as often as told by your health care provider. Use the heat source that your health care provider recommends, such as a moist heat pack or a heating pad.  Place a towel between your skin and the heat source.  Leave the heat on for 20–30 minutes.  Remove the heat if your skin turns bright red. This is especially important if you are unable to feel pain, heat, or cold. You may have a greater risk of getting burned.  If directed, put ice on the painful area.  Put ice in a plastic bag.  Place a towel between your skin and the bag.  Leave the ice on for 20 minutes, 2–3 times a day.  General instructions     Your health care provider may recommend that you see a physical therapist. This person can help you come up with a safe exercise program. Do any exercises as told by your physical therapist.  Keep all follow-up visits, including any physical therapy visits, as told by your health care providers. This is important.  Contact a health care provider if:  Your pain gets worse.  Medicines do not help ease your pain.  You cannot use the part of your body that hurts, such as your arm, leg, or neck.  You have trouble sleeping.  You have trouble doing your normal activities.  Get help right away if:  You have a new injury and your pain is worse or different.  You feel numb or you have tingling in the painful area.  Summary  Musculoskeletal pain refers to aches and pains in your bones, joints, muscles, and the tissues that surround them.  This pain can occur in any part of the body.  Your health care provider may recommend that you see a physical therapist. This person can help you come up with a safe exercise program. Do any exercises as told by your physical therapist.  Lower your stress level. Stress can worsen musculoskeletal pain. Ways to lower stress may include meditation, yoga, cognitive or behavioral therapy, acupuncture, and massage therapy.  This information is not intended to replace advice given to you by your health care provider. Make sure you discuss any questions you have with your health care provider.

## 2019-05-28 ENCOUNTER — RECORD ABSTRACTING (OUTPATIENT)
Age: 64
End: 2019-05-28

## 2019-07-03 ENCOUNTER — APPOINTMENT (OUTPATIENT)
Dept: ORTHOPEDIC SURGERY | Facility: CLINIC | Age: 64
End: 2019-07-03

## 2019-07-12 ENCOUNTER — APPOINTMENT (OUTPATIENT)
Dept: ORTHOPEDIC SURGERY | Facility: CLINIC | Age: 64
End: 2019-07-12

## 2019-08-06 ENCOUNTER — APPOINTMENT (OUTPATIENT)
Dept: PULMONOLOGY | Facility: CLINIC | Age: 64
End: 2019-08-06
Payer: COMMERCIAL

## 2019-08-06 VITALS
RESPIRATION RATE: 12 BRPM | DIASTOLIC BLOOD PRESSURE: 70 MMHG | TEMPERATURE: 98.2 F | HEIGHT: 65 IN | WEIGHT: 183 LBS | SYSTOLIC BLOOD PRESSURE: 110 MMHG | BODY MASS INDEX: 30.49 KG/M2 | OXYGEN SATURATION: 97 % | HEART RATE: 80 BPM

## 2019-08-06 DIAGNOSIS — Z86.39 PERSONAL HISTORY OF OTHER ENDOCRINE, NUTRITIONAL AND METABOLIC DISEASE: ICD-10-CM

## 2019-08-06 DIAGNOSIS — R05 COUGH: ICD-10-CM

## 2019-08-06 DIAGNOSIS — Z82.49 FAMILY HISTORY OF ISCHEMIC HEART DISEASE AND OTHER DISEASES OF THE CIRCULATORY SYSTEM: ICD-10-CM

## 2019-08-06 DIAGNOSIS — Z86.79 PERSONAL HISTORY OF OTHER DISEASES OF THE CIRCULATORY SYSTEM: ICD-10-CM

## 2019-08-06 PROCEDURE — 99204 OFFICE O/P NEW MOD 45 MIN: CPT

## 2019-08-06 RX ORDER — ZOLPIDEM TARTRATE 10 MG/1
10 TABLET ORAL
Qty: 30 | Refills: 0 | Status: DISCONTINUED | COMMUNITY
Start: 2019-03-05

## 2019-08-06 RX ORDER — LIDOCAINE 5% 700 MG/1
5 PATCH TOPICAL
Qty: 30 | Refills: 0 | Status: ACTIVE | COMMUNITY
Start: 2019-07-23

## 2019-08-06 RX ORDER — DICLOFENAC SODIUM 10 MG/G
1 GEL TOPICAL
Qty: 300 | Refills: 0 | Status: ACTIVE | COMMUNITY
Start: 2018-08-03

## 2019-08-06 RX ORDER — IBUPROFEN 600 MG/1
600 TABLET ORAL
Qty: 21 | Refills: 0 | Status: DISCONTINUED | COMMUNITY
Start: 2019-05-25

## 2019-08-06 RX ORDER — TRIAMCINOLONE ACETONIDE 1 MG/G
0.1 CREAM TOPICAL
Qty: 80 | Refills: 0 | Status: ACTIVE | COMMUNITY
Start: 2019-03-12

## 2019-08-06 RX ORDER — DIAZEPAM 5 MG/1
5 TABLET ORAL
Qty: 3 | Refills: 0 | Status: DISCONTINUED | COMMUNITY
Start: 2019-05-25

## 2019-08-06 RX ORDER — PROPRANOLOL HYDROCHLORIDE 10 MG/1
10 TABLET ORAL
Qty: 180 | Refills: 0 | Status: DISCONTINUED | COMMUNITY
Start: 2019-03-11

## 2019-08-06 NOTE — PHYSICAL EXAM
[Well Groomed] : well groomed [Normal Appearance] : normal appearance [General Appearance - Well Developed] : well developed [General Appearance - Well Nourished] : well nourished [No Deformities] : no deformities [General Appearance - In No Acute Distress] : no acute distress [Normal Conjunctiva] : the conjunctiva exhibited no abnormalities [Normal Oropharynx] : normal oropharynx [Eyelids - No Xanthelasma] : the eyelids demonstrated no xanthelasmas [III] : III [Neck Appearance] : the appearance of the neck was normal [Jugular Venous Distention Increased] : there was no jugular-venous distention [Neck Cervical Mass (___cm)] : no neck mass was observed [Thyroid Diffuse Enlargement] : the thyroid was not enlarged [Thyroid Nodule] : there were no palpable thyroid nodules [Heart Rate And Rhythm] : heart rate and rhythm were normal [Heart Sounds] : normal S1 and S2 [Murmurs] : no murmurs present [Respiration, Rhythm And Depth] : normal respiratory rhythm and effort [Auscultation Breath Sounds / Voice Sounds] : lungs were clear to auscultation bilaterally [Exaggerated Use Of Accessory Muscles For Inspiration] : no accessory muscle use [Abdomen Tenderness] : non-tender [Abdomen Soft] : soft [Abdomen Mass (___ Cm)] : no abdominal mass palpated [Abnormal Walk] : normal gait [Gait - Sufficient For Exercise Testing] : the gait was sufficient for exercise testing [Nail Clubbing] : no clubbing of the fingernails [Cyanosis, Localized] : no localized cyanosis [Petechial Hemorrhages (___cm)] : no petechial hemorrhages [Skin Color & Pigmentation] : normal skin color and pigmentation [Skin Turgor] : normal skin turgor [] : no rash [Deep Tendon Reflexes (DTR)] : deep tendon reflexes were 2+ and symmetric [Sensation] : the sensory exam was normal to light touch and pinprick [Oriented To Time, Place, And Person] : oriented to person, place, and time [No Focal Deficits] : no focal deficits [Impaired Insight] : insight and judgment were intact [Affect] : the affect was normal

## 2019-08-07 NOTE — HISTORY OF PRESENT ILLNESS
[FreeTextEntry1] : This is a 65 y/o woman w/ a PMH of Hypothyroidism who presents to establish care due to a concern of second hand smoke exposure and a 3 month hx of dry cough. The patient is a lifelong never smoker. She notes that for the past 3 years the tenants in the apt below her have been smoking, this leaves a smell in her own apartment that she is concerned is increasing her risk for emphysema. The patient notes a dry cough for the past 3 months, it is dry, no fevers/chills, no wheezing.The patient denies any hx of asthma or prior lung diseases. The patient denies hx of GERD but notes she takes prilosec due to chronic NSAID use for back and leg pain however she takes the prilosec after eating breakfast. The patient has been seen by ENT in the past and per her had a laryngoscopy which was normal. The patient works as a  at [employer] on [address]. No hx of mold exposure or pets.

## 2019-08-07 NOTE — ASSESSMENT
[FreeTextEntry1] : This is a 63 y/o woman with a hx of cough for the past 3 months. No hx of allergies. The patients cough seems very mild, does not appear infectious based off of hx and is not associate with dyspnea. The pt was ordered for PFT today in clinic however the patient did not tolerate it and it was discontinued prematurely. The patient was concerned about second hand smoke exposure however it was explained that the smell of tobacco in her apartment from the tenants below does not qualify as significant second hand smoke exposure. \par \par 1. Chronic cough\par \par Plan:\par -Pt instructed on proper use of PPI to see if this helps with her cough symptoms. \par -Plan was for PFTs to rule out asthma as a cause but pt did not tolerate. \par -Chest Xray ordered to rule out any sancho parenchymal abnormalities that may explain symptoms. \par -F/U next to discuss imaging results and re-assess symptoms.

## 2019-08-07 NOTE — REVIEW OF SYSTEMS
[Dry Eyes] : dryness of the eyes [Cough] : cough [Itchy Eyes] : itching of ~T the eyes [Negative] : Pulmonary Hypertension [Fever] : no fever [Chills] : no chills [Fatigue] : no fatigue [Sputum] : not coughing up ~M sputum [Hemoptysis] : no hemoptysis [Dyspnea] : no dyspnea [Chest Tightness] : no chest tightness [Pleuritic Pain] : no pleuritic pain [Wheezing] : no wheezing [Hay Fever] : no hay fever [Watery Eyes] : no discharge from the eyes [Nasal Discharge] : no nasal discharge

## 2019-08-20 NOTE — ED PROVIDER NOTE - CHIEF COMPLAINT
The patient is a 63y Female complaining of Rifampin Counseling: I discussed with the patient the risks of rifampin including but not limited to liver damage, kidney damage, red-orange body fluids, nausea/vomiting and severe allergy.

## 2019-12-04 ENCOUNTER — OUTPATIENT (OUTPATIENT)
Dept: OUTPATIENT SERVICES | Facility: HOSPITAL | Age: 64
LOS: 1 days | End: 2019-12-04
Payer: COMMERCIAL

## 2019-12-04 ENCOUNTER — APPOINTMENT (OUTPATIENT)
Dept: ULTRASOUND IMAGING | Facility: HOSPITAL | Age: 64
End: 2019-12-04
Payer: COMMERCIAL

## 2019-12-04 PROCEDURE — 76536 US EXAM OF HEAD AND NECK: CPT

## 2019-12-04 PROCEDURE — 76536 US EXAM OF HEAD AND NECK: CPT | Mod: 26

## 2019-12-18 ENCOUNTER — APPOINTMENT (OUTPATIENT)
Dept: ULTRASOUND IMAGING | Facility: HOSPITAL | Age: 64
End: 2019-12-18

## 2020-08-03 ENCOUNTER — APPOINTMENT (OUTPATIENT)
Dept: ORTHOPEDIC SURGERY | Facility: CLINIC | Age: 65
End: 2020-08-03

## 2020-08-03 DIAGNOSIS — S90.32XD CONTUSION OF LEFT FOOT, SUBSEQUENT ENCOUNTER: ICD-10-CM

## 2020-08-03 DIAGNOSIS — S90.31XD CONTUSION OF RIGHT FOOT, SUBSEQUENT ENCOUNTER: ICD-10-CM

## 2020-08-03 DIAGNOSIS — M79.674 PAIN IN RIGHT TOE(S): ICD-10-CM

## 2020-08-05 ENCOUNTER — APPOINTMENT (OUTPATIENT)
Dept: ORTHOPEDIC SURGERY | Facility: CLINIC | Age: 65
End: 2020-08-05

## 2020-08-07 ENCOUNTER — APPOINTMENT (OUTPATIENT)
Dept: ORTHOPEDIC SURGERY | Facility: CLINIC | Age: 65
End: 2020-08-07

## 2020-08-19 ENCOUNTER — APPOINTMENT (OUTPATIENT)
Dept: ORTHOPEDIC SURGERY | Facility: CLINIC | Age: 65
End: 2020-08-19
Payer: COMMERCIAL

## 2020-08-20 ENCOUNTER — APPOINTMENT (OUTPATIENT)
Dept: ORTHOPEDIC SURGERY | Facility: CLINIC | Age: 65
End: 2020-08-20
Payer: COMMERCIAL

## 2020-08-20 VITALS — WEIGHT: 178 LBS | HEIGHT: 65 IN | BODY MASS INDEX: 29.66 KG/M2

## 2020-08-20 DIAGNOSIS — S90.31XA CONTUSION OF RIGHT FOOT, INITIAL ENCOUNTER: ICD-10-CM

## 2020-08-20 DIAGNOSIS — S90.32XA CONTUSION OF LEFT FOOT, INITIAL ENCOUNTER: ICD-10-CM

## 2020-08-20 DIAGNOSIS — S86.111A STRAIN OF OTHER MUSCLE(S) AND TENDON(S) OF POSTERIOR MUSCLE GROUP AT LOWER LEG LEVEL, RIGHT LEG, INITIAL ENCOUNTER: ICD-10-CM
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
## 2020-08-20 PROCEDURE — 99214 OFFICE O/P EST MOD 30 MIN: CPT

## 2020-08-20 PROCEDURE — 73590 X-RAY EXAM OF LOWER LEG: CPT | Mod: RT

## 2020-08-20 PROCEDURE — 73620 X-RAY EXAM OF FOOT: CPT | Mod: 50

## 2020-08-20 RX ORDER — ATORVASTATIN CALCIUM 20 MG/1
20 TABLET, FILM COATED ORAL
Qty: 90 | Refills: 0 | Status: ACTIVE | COMMUNITY
Start: 2020-08-05

## 2020-08-20 RX ORDER — AMPICILLIN 500 MG/1
500 CAPSULE ORAL
Qty: 14 | Refills: 0 | Status: COMPLETED | COMMUNITY
Start: 2020-07-06

## 2020-08-20 NOTE — ASSESSMENT
[FreeTextEntry1] : 65-year-old with a slip and fall about 3 weeks ago. She likely had a contusion to the heels and possible strain of the RIGHT medial gastrocnemius. She is feeling progressively better. She will continue to wear the sneakers which have good cushioning and support. She can gently stretch her calf muscle as it's getting better and work ankle range of motion and strengthening progressively.\par to help with fall prevention she should work on balance and general lower extremity strengthening from the core all the way down to her feet. she was given exercise sheets to do. If anything is uncomfortable or painful she should not do it.She can take the ibuprofen as needed. Warm soaks and ice and elevate as needed.\par Followup 3-4 wks

## 2020-08-20 NOTE — PHYSICAL EXAM
[de-identified] : Bilateral feet/lower legs:\par She walks with a cautious gait when barefoot. She denies having any pain over a short distance when walking.\par No deformity of her feet. No edema or ecchymoses. Skin is intact.\par Normal Rodrigues test bilaterally.\par She can raise up and down on her LEFT toes without difficulty but cannot do a single heel raise on the RIGHT She could go up and down on both toes without pain or difficulty on the RIGHT however\par very mildly tender in the RIGHT medial gastrocnemius and in the heels but no significant point tenderness. Nontender to the foot and toes and ankles medial and lateral malleolus\par Hallux MP joint range of motion is with about 40° dorsiflexion and 20° plantar flexion without crepitus. No deformity of the hallux or toes.\par Sensation is intact. Intact EHL and FHL bilaterally.\par Normal capillary refill.\par left 2nd hammertoe\par Healed lateral left ankle incision from prior ankle fracture surgery.\par \par lumbar spine range of motion is good with forward flexion and reaching her feet and extension without pain or difficulty.\par Motor and sensation are intact distally [de-identified] : No respiratory distress or coughing [de-identified] : \par x-rays bilateral feet AP and lateral views today show no fractures. Tiny posterior calcaneal spurs\par Hardware in the LEFT lateral malleolus from prior fracture ORIF. No acute changes. No significant osteoarthritis is seen\par \par X-rays RIGHT tibia and fibula AP and lateral views today are unremarkable

## 2020-08-20 NOTE — HISTORY OF PRESENT ILLNESS
[de-identified] : Zenaida comes in for her feet and legs.  It has been 15+ mos since she was last seen for her feet from a work related injury 2 1/2 yrs ago where a chair came down hard on her RIGHT greater than LEFT hallux.\par Her feet had recovered and she was walking a few miles per day and feeling good. She slipped and fell on a slippery kitchen floor which is stone on July 30, 2020. Her feet slipped forward and she fell on her back which was sore for a week or 2 but now is better. She hit her RIGHT calf and also the backs of her heels. There was some bruising in her legs. She had some pain and difficulty walking but it has gotten progressively better. Her pain can be intermittently 5/10. The RIGHT was worse than the LEFT. She applied ice and the beginning and recently has been applying heat. Pain feels better taking Advil, Flexeril and rest and heat and is worse when walking. Pain is dull and achy typically. She had some swelling in the RIGHT anterior shin even though she didn't hit this shin She hasn't had any workup or treatment. She feels weak because of the injury.\par She also was having some LEFT shoulder discomfort even before this fall where there would be some pain lifting her arm. It seems to have gotten somewhat better. She mentioned this after x-rays. I suggested that she do some assisted motion and take it easy and she is taking NSAIDs. If it's not better by next visit we will work it up further.

## 2020-09-18 ENCOUNTER — APPOINTMENT (OUTPATIENT)
Dept: ORTHOPEDIC SURGERY | Facility: CLINIC | Age: 65
End: 2020-09-18

## 2021-06-28 ENCOUNTER — TRANSCRIPTION ENCOUNTER (OUTPATIENT)
Age: 66
End: 2021-06-28

## 2021-06-28 ENCOUNTER — APPOINTMENT (OUTPATIENT)
Dept: ORTHOPEDIC SURGERY | Facility: CLINIC | Age: 66
End: 2021-06-28
Payer: COMMERCIAL

## 2021-06-28 VITALS — WEIGHT: 187 LBS | BODY MASS INDEX: 31.16 KG/M2 | HEIGHT: 65 IN

## 2021-06-28 DIAGNOSIS — M67.912 UNSPECIFIED DISORDER OF SYNOVIUM AND TENDON, LEFT SHOULDER: ICD-10-CM

## 2021-06-28 DIAGNOSIS — S82.424D: ICD-10-CM

## 2021-06-28 DIAGNOSIS — M79.671 PAIN IN RIGHT FOOT: ICD-10-CM

## 2021-06-28 DIAGNOSIS — M17.11 UNILATERAL PRIMARY OSTEOARTHRITIS, RIGHT KNEE: ICD-10-CM

## 2021-06-28 DIAGNOSIS — M75.42 IMPINGEMENT SYNDROME OF LEFT SHOULDER: ICD-10-CM

## 2021-06-28 PROCEDURE — 99215 OFFICE O/P EST HI 40 MIN: CPT

## 2021-06-28 PROCEDURE — 99072 ADDL SUPL MATRL&STAF TM PHE: CPT

## 2021-06-28 PROCEDURE — 73610 X-RAY EXAM OF ANKLE: CPT | Mod: RT

## 2021-06-28 PROCEDURE — 73620 X-RAY EXAM OF FOOT: CPT | Mod: RT

## 2021-06-28 PROCEDURE — 73564 X-RAY EXAM KNEE 4 OR MORE: CPT | Mod: RT

## 2021-06-28 PROCEDURE — 73030 X-RAY EXAM OF SHOULDER: CPT | Mod: LT

## 2021-06-28 NOTE — HISTORY OF PRESENT ILLNESS
[de-identified] : Ms. Guillaume is now 67 yo and presents for multiple joint issues.\par First of all she complains of pain in her right knee that has been going on for a while.\par She fell last year at home in her kitchen and fell once2 + yrs ago on steps in [place].  She is not sure if either of these injuries caused knee pain\par She [facility] and was seen by Dr Garcia for injury to her back which is a chronic issue.\par The Knee was never looked at by other doctors..\par She states that the right knee tends to hurt constantly and swells intermittently over the last 4 months or so. She doesn't take anything for pain.\par \par She also complains of pain in both feet which is all over the foot and ankle bilaterally.  Pain is not particularly localized.  She has pain on the bottoms of her feet and the lateral side of the foot and ankle.  She had prior ORIF of a left ankle fracture but the left one does not necessarily hurt more than the right.  On the right side she had a contusion of the hallux and second toe.  She states that it hurts mostly after she walks on pavement.  While walking on pavement its not so bad.  She has sneakers which are old but she has had a hard time finding ones to replace them that are comfortable.  She has gel pads in her shoes.\par \par She also complains of a left shoulder pain that started last summer in August.  She had mentioned it to me last time when we were looking at other issues.  It can hurt lifting her arm.  She does do some stretches with it. She never did PT. \par He has been taking care of her 94 yo mother who almost a year ago had a hip fracture treated surgically but has had a very difficult recovery.

## 2021-06-28 NOTE — REASON FOR VISIT
[Follow-Up Visit] : a follow-up visit for [Shoulder Pain] : shoulder pain [Knee Pain] : knee pain [FreeTextEntry2] : Foot pain

## 2021-06-28 NOTE — ASSESSMENT
[FreeTextEntry1] : 65 yo woman comes in with multiple orthopedic issues and joint pains.\par Left shoulder appears to have rotator cuff tendinopathy and bursitis.  She could have a degenerative rotator cuff tear which has a high frequency as people get older due to the degeneration in the tendons.  She has relatively good motion and good strength but there is pain.  I will first have her try physical therapy and work on strengthening the rotator cuff and periscapular muscles and will see if her pain resolves.  I would recommend taking an anti-inflammatory for a couple weeks as well and use heat and ice.  She should be careful lifting particularly anything above shoulder height.\par She has diffuse pain in the feet and ankles.  She has had prior injuries in these areas and had ORIF of the left lateral malleolus fracture in the past and had contusion to the right forefoot.\par She should wear good supportive shoes.  She has a pair of sneakers that are several years old but they are the only shoe that feels comfortable to her which is very challenging because they discontinued this model.\par I would recommend trying to find a new sneakers since these are wearing down.  She has cushion in the shoes.  I think she has some metatarsalgia.\par She had a right proximal fibula fracture about a year ago when she fell.  That healed very well.\par She has chronic right knee pain and there is mild osteoarthritis medial and patellofemoral compartments.\par For this she will do therapy.  She should work on weight loss because she has gained weight and her BMI is now over 30.  Getting weight off will take stress off her knees and feet and ankles and may help her pain.\par She was given strengthening exercises to do and will do physical therapy.\par Ice and heat and ibuprofen as needed.\par Follow-up in about 2 months or as needed depending on how she does with the therapy.

## 2021-06-28 NOTE — PHYSICAL EXAM
[LE] : 5/5 motor strength in bilateral lower extremities [DP] : dorsalis pedis 2+ and symmetric bilaterally [PT] : posterior tibial 2+ and symmetric bilaterally [Normal RLE] : Right Lower Extremity: No scars, rashes, lesions, ulcers, skin intact [Normal LLE] : Left Lower Extremity: No scars, rashes, lesions, ulcers, skin intact [Normal Touch] : sensation intact for touch [Normal] : Gait: normal [Normal RUE] : Right Upper Extremity: No scars, rashes, lesions, ulcers, skin intact [Normal LUE] : Left Upper Extremity: No scars, rashes, lesions, ulcers, skin intact [de-identified] : Bilateral feet/lower legs:\par She walks with a cautious gait when barefoot. She denies having any pain over a short distance when walking.\par No deformity of her feet. No edema or ecchymoses. Skin is intact.\par Normal Rodrigues test bilaterally.\par She can walk on her heels and toes\par No significant point tenderness feet and ankles\par Left s/P ORIF lateral mall with palpable hardware\par Hallux MP joint range of motion is with about 40° dorsiflexion and 20° plantar flexion without crepitus. No deformity of the hallux or toes.\par Sensation is intact. Intact EHL and FHL bilaterally.\par Normal capillary refill.\par left 2nd hammertoe\par \par Left shoulder\par Forward active elevation to 175 degrees with mild pain and painful arc mid range.  No drop arm.  Internal rotation to T10.\par About 60 degrees external rotation with her arms at her sides.\par 5 -/5 supraspinatus, internal rotation and external rotation left shoulder with mild pain on the left.\par Positive Neer and Pierce test.  Mildly positive speeds.\par \par Right knee\par Trace effusion.\par Range of motion 0-130 degrees with mild discomfort on full flexion.\par Mildly tender medial joint line and patella facets.\par Negative Skyler.  IA Lachman.  Negative pivot shift.  Normal anterior posterior drawer and varus and valgus laxity. [de-identified] : No respiratory distress or coughing [de-identified] : \par X-rays of the right knee weightbearing 4 views taken today show healed proximal fibular fracture.  There is mild degenerative changes in the knee joint with mild medial joint space narrowing and small osteophyte and small osteophytes around the patella with mild narrowing as well.  This is consistent with mild osteoarthritis.\par Looking back at x-rays of the right tibia and fibula from August 20, 2020 there was a hairline nondisplaced proximal fibula fracture and therefore this probably occurred in the injury in July of last year.  She states that the pain went away a few weeks after I saw her in August.\par \par X-rays of the left shoulder AP, Y lateral and axillary views today show no significant glenohumeral arthritis.  Unremarkable.\par \par X-rays of the right foot and ankle weightbearing 5 views today show no significant arthritis.  Intact mortise.  No fractures.

## 2021-09-30 ENCOUNTER — APPOINTMENT (OUTPATIENT)
Dept: ENDOCRINOLOGY | Facility: CLINIC | Age: 66
End: 2021-09-30

## 2021-11-02 ENCOUNTER — APPOINTMENT (OUTPATIENT)
Dept: ENDOCRINOLOGY | Facility: CLINIC | Age: 66
End: 2021-11-02
Payer: COMMERCIAL

## 2021-11-02 VITALS
WEIGHT: 191 LBS | DIASTOLIC BLOOD PRESSURE: 73 MMHG | SYSTOLIC BLOOD PRESSURE: 137 MMHG | BODY MASS INDEX: 31.78 KG/M2 | HEART RATE: 83 BPM

## 2021-11-02 DIAGNOSIS — M81.0 AGE-RELATED OSTEOPOROSIS W/OUT CURRENT PATHOLOGICAL FRACTURE: ICD-10-CM

## 2021-11-02 DIAGNOSIS — E04.2 NONTOXIC MULTINODULAR GOITER: ICD-10-CM

## 2021-11-02 DIAGNOSIS — E03.9 HYPOTHYROIDISM, UNSPECIFIED: ICD-10-CM

## 2021-11-02 PROCEDURE — 36415 COLL VENOUS BLD VENIPUNCTURE: CPT

## 2021-11-02 PROCEDURE — 99205 OFFICE O/P NEW HI 60 MIN: CPT | Mod: 25

## 2021-11-03 NOTE — HISTORY OF PRESENT ILLNESS
[FreeTextEntry1] : 65 y/o F w/ Hx of hypothyroidism, MNG, HLD, OP\par here for initial evaluation and management of thyroid issues\par generally feels well and endorses no acute complaints.\par reports cc of long standing hx of MNG. last biopsy was on 7/2021 at [facility], L and R thyroid nodules found to be benign. reports past remote biopsies by Dr. Rodas. Also reports long standing hx of hypothyrodism, on Synthroid 50 mcg She otherwise denies any f/c, CP, SOB, palpitations, tremors, depressed mood, anxiety, palpitations, n/v, stool/urinary abn, skin/weight changes, heat/cold intolerance, HAs, breast/nipple changes, polyuria/polydipsia/nocturia or other complaints.\par she again denies any dysphagia, hoarseness, neck tenderness or new palpable masses. she again denies any family history of thyroid disorders or personal exposure to ionizing radiation.\par Brings in 1st screening DEXA from 7/2021. report shows OP w/ t score of -2.9 on L hip and femour, -2.7 on spine. reports chronic OA and spine issues. denies steroid exposure. remote traumatic elbow fracture. + sister w/ OP and maternal hip fracture. has never taken BPN. compliant w/ Vit D and dietary Calcium intake. denies nephrolithiasis.\par

## 2021-11-03 NOTE — ASSESSMENT
[Bisphosphonate Therapy] : Risks and benefits of bisphosphonate therapy were  discussed with the patient including gastroesophageal irritation, osteonecrosis of the jaw, and atypical femur fractures, and acute phase reaction [Levothyroxine] : The patient was instructed to take Levothyroxine on an empty stomach, separate from vitamins, and wait at least 30 minutes before eating [FreeTextEntry1] : MNG\par r/b/a to thyroid biopsy, management of thyroid nodules reviewed. benign b/l nodules on 7/2021, repeat US in 7/2022. Verbalized understanding and agrees with treatment plan, will contact MD and seek emergency medical care if condition changes.\par \par Hypothyroidism:\par Appears clinically euthyroid at this time on 50 mcg of LT4 (taking med appropriately). Reassess TFTs and need for medication titration at this time. Reviewed importance of compliance and proper intake\par \par Bone health:\par DEXA in the osteoporotic range. Given her hip fracture risk of >3%, she would classify as osteoporotic and would benefit from 3-5 years of antiresorptive therapy, likely w/ oral BP. Risks and benefits including ONJ, AF and GERD discussed at length. She verbalized understanding and she would like to continue vitamin d and dietary calcium for 2ry prevention of fractures. rule out secondary causes of OP. Referred to physical therapy for LE strengthening exercises and mobility assessment.\par \par

## 2021-11-11 LAB
24R-OH-CALCIDIOL SERPL-MCNC: 67 PG/ML
25(OH)D3 SERPL-MCNC: 36.1 NG/ML
ALBUMIN SERPL ELPH-MCNC: 4.7 G/DL
ALP BLD-CCNC: 118 U/L
ALP BONE SERPL-MCNC: 19.6 UG/L
ALT SERPL-CCNC: 21 U/L
ANION GAP SERPL CALC-SCNC: 16 MMOL/L
AST SERPL-CCNC: 19 U/L
BILIRUB SERPL-MCNC: 0.5 MG/DL
BUN SERPL-MCNC: 12 MG/DL
CALCIUM SERPL-MCNC: 9.9 MG/DL
CALCIUM SERPL-MCNC: 9.9 MG/DL
CHLORIDE SERPL-SCNC: 103 MMOL/L
CO2 SERPL-SCNC: 22 MMOL/L
CREAT SERPL-MCNC: 0.66 MG/DL
GLUCOSE SERPL-MCNC: 93 MG/DL
MAGNESIUM SERPL-MCNC: 2.3 MG/DL
PARATHYROID HORMONE INTACT: 23 PG/ML
PHOSPHATE SERPL-MCNC: 3.9 MG/DL
POTASSIUM SERPL-SCNC: 5 MMOL/L
PROT SERPL-MCNC: 7 G/DL
SODIUM SERPL-SCNC: 141 MMOL/L
T3 SERPL-MCNC: 112 NG/DL
T4 FREE SERPL-MCNC: 1.3 NG/DL
TSH SERPL-ACNC: 1.27 UIU/ML

## 2021-11-11 RX ORDER — LEVOTHYROXINE SODIUM 50 UG/1
50 TABLET ORAL
Qty: 90 | Refills: 0 | Status: ACTIVE | COMMUNITY
Start: 1900-01-01 | End: 1900-01-01

## 2022-01-22 NOTE — ED PROVIDER NOTE - OBJECTIVE STATEMENT
61 yo female with hx of HLD, mitral valve issues, hypothyroidism and sleep disorder, on ambien 10 mg, presents after she took an ambien and then went to the bathroom last night approx 9 hours ago, and fell with (+) right forehead hematoma. Pt does not remember circumstances around the fall. Does not remember if she felt dizzy or lightheaded prior to fall. Denies CP/SOB. States she was on the bathroom floor and then got up and noted to have large swelling and hematoma to left forehead. Unknown LOC. Pt states she then iced it all night and presents this morning for evaluation. No hx of anticoagulation use. No hx of CAD. No other complaints. Feels fine now with no focal neuro complaints. No neck pain. No HA. Pt has had 1 prior fall after ambien use, but states head trauma that time was "not this bad". English

## 2025-04-22 ENCOUNTER — APPOINTMENT (OUTPATIENT)
Dept: ORTHOPEDIC SURGERY | Facility: CLINIC | Age: 70
End: 2025-04-22
Payer: MEDICARE

## 2025-04-22 VITALS — BODY MASS INDEX: 34.15 KG/M2 | WEIGHT: 200 LBS | HEIGHT: 64 IN

## 2025-04-22 DIAGNOSIS — S90.31XD CONTUSION OF RIGHT FOOT, SUBSEQUENT ENCOUNTER: ICD-10-CM

## 2025-04-22 DIAGNOSIS — Z87.39 PERSONAL HISTORY OF OTHER DISEASES OF THE MUSCULOSKELETAL SYSTEM AND CONNECTIVE TISSUE: ICD-10-CM

## 2025-04-22 DIAGNOSIS — S90.31XA CONTUSION OF RIGHT FOOT, INITIAL ENCOUNTER: ICD-10-CM

## 2025-04-22 DIAGNOSIS — S90.32XD CONTUSION OF LEFT FOOT, SUBSEQUENT ENCOUNTER: ICD-10-CM

## 2025-04-22 DIAGNOSIS — M17.0 BILATERAL PRIMARY OSTEOARTHRITIS OF KNEE: ICD-10-CM

## 2025-04-22 DIAGNOSIS — S90.32XA CONTUSION OF LEFT FOOT, INITIAL ENCOUNTER: ICD-10-CM
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
## 2025-04-22 PROCEDURE — 73564 X-RAY EXAM KNEE 4 OR MORE: CPT | Mod: 50

## 2025-04-22 PROCEDURE — 99203 OFFICE O/P NEW LOW 30 MIN: CPT

## 2025-04-22 RX ORDER — ALENDRONATE SODIUM 70 MG/1
TABLET ORAL
Refills: 0 | Status: ACTIVE | COMMUNITY